# Patient Record
Sex: MALE | Race: WHITE | Employment: UNEMPLOYED | URBAN - METROPOLITAN AREA
[De-identification: names, ages, dates, MRNs, and addresses within clinical notes are randomized per-mention and may not be internally consistent; named-entity substitution may affect disease eponyms.]

---

## 2021-11-14 ENCOUNTER — APPOINTMENT (OUTPATIENT)
Dept: CT IMAGING | Facility: HOSPITAL | Age: 41
End: 2021-11-14
Attending: EMERGENCY MEDICINE
Payer: COMMERCIAL

## 2021-11-14 ENCOUNTER — APPOINTMENT (OUTPATIENT)
Dept: GENERAL RADIOLOGY | Facility: HOSPITAL | Age: 41
End: 2021-11-14
Attending: EMERGENCY MEDICINE
Payer: COMMERCIAL

## 2021-11-14 ENCOUNTER — HOSPITAL ENCOUNTER (EMERGENCY)
Facility: HOSPITAL | Age: 41
Discharge: ANOTHER HOSPITAL | End: 2021-11-14
Attending: EMERGENCY MEDICINE
Payer: COMMERCIAL

## 2021-11-14 VITALS
HEART RATE: 110 BPM | TEMPERATURE: 98 F | WEIGHT: 120 LBS | RESPIRATION RATE: 14 BRPM | OXYGEN SATURATION: 98 % | SYSTOLIC BLOOD PRESSURE: 106 MMHG | DIASTOLIC BLOOD PRESSURE: 69 MMHG

## 2021-11-14 DIAGNOSIS — J96.01 ACUTE RESPIRATORY FAILURE WITH HYPOXIA (HCC): Primary | ICD-10-CM

## 2021-11-14 DIAGNOSIS — C71.9 MALIGNANT NEOPLASM OF BRAIN, UNSPECIFIED LOCATION (HCC): ICD-10-CM

## 2021-11-14 PROCEDURE — 31500 INSERT EMERGENCY AIRWAY: CPT

## 2021-11-14 PROCEDURE — 93005 ELECTROCARDIOGRAM TRACING: CPT

## 2021-11-14 PROCEDURE — 71045 X-RAY EXAM CHEST 1 VIEW: CPT | Performed by: EMERGENCY MEDICINE

## 2021-11-14 PROCEDURE — 96365 THER/PROPH/DIAG IV INF INIT: CPT

## 2021-11-14 PROCEDURE — 85025 COMPLETE CBC W/AUTO DIFF WBC: CPT | Performed by: EMERGENCY MEDICINE

## 2021-11-14 PROCEDURE — 94002 VENT MGMT INPAT INIT DAY: CPT

## 2021-11-14 PROCEDURE — 87040 BLOOD CULTURE FOR BACTERIA: CPT | Performed by: EMERGENCY MEDICINE

## 2021-11-14 PROCEDURE — 93010 ELECTROCARDIOGRAM REPORT: CPT | Performed by: EMERGENCY MEDICINE

## 2021-11-14 PROCEDURE — 51702 INSERT TEMP BLADDER CATH: CPT

## 2021-11-14 PROCEDURE — 99285 EMERGENCY DEPT VISIT HI MDM: CPT

## 2021-11-14 PROCEDURE — 80048 BASIC METABOLIC PNL TOTAL CA: CPT | Performed by: EMERGENCY MEDICINE

## 2021-11-14 RX ORDER — ETOMIDATE 2 MG/ML
INJECTION INTRAVENOUS
Status: COMPLETED | OUTPATIENT
Start: 2021-11-14 | End: 2021-11-14

## 2021-11-14 RX ORDER — ETOMIDATE 2 MG/ML
INJECTION INTRAVENOUS
Status: DISCONTINUED
Start: 2021-11-14 | End: 2021-11-14

## 2021-11-14 NOTE — ED PROVIDER NOTES
Patient Seen in: [facility] AND [facility] Emergency Department    History   Patient presents with:  Difficulty Breathing    Stated Complaint:     HPI    Patient with history of malignant oligodendroglioma intracranial presents to the emergency department by amb open but does not appear to be understanding me when I am talking to him he is not following commands Vital signs noted. Eye:  No scleral icterus. Eyelids appear normal, no lesions.   HEENT: It is noted that there is no skull present on the left side ther 24 at the lips. There was equal chest rise and equal air movement and no air noted in the epigastric area the CO2 detector did change colors.   I have contacted our  to try to facilitate transport to [facility]    I did speak with physician dirk esqueda SLIDE   BLOOD CULTURE   BLOOD CULTURE     EKG    Rate, intervals and axes as noted on EKG Report.   Rate: EKG shows rate 112 sinus tachycardia no ST elevation is noted              MDM     Borderline at this time 91 to 92% pulse oximetry    Cardiac Monitor:

## 2021-11-14 NOTE — CM/SW NOTE
Contacted [facility] for possible transfer. Pt information provided and they will call Dr Bella Perry for report.     Face sheet faxed to [phone]

## 2021-11-14 NOTE — ED QUICK NOTES
Spoke to Ewen  act will be used eta roughly 30 mins. Called and gave report to Amandeep English rn at [phone] pt is going to bed 2401.

## 2021-11-14 NOTE — ED QUICK NOTES
20mg of etomidate given at right hand 10:15AM  100mg of suc at 10:17AM  Intubation in Progress 10:18AM  8mmTube placed, 24 kentrell at lip 10:18AM